# Patient Record
Sex: MALE | Race: WHITE | NOT HISPANIC OR LATINO | Employment: OTHER | URBAN - NONMETROPOLITAN AREA
[De-identification: names, ages, dates, MRNs, and addresses within clinical notes are randomized per-mention and may not be internally consistent; named-entity substitution may affect disease eponyms.]

---

## 2023-01-20 PROBLEM — G47.30 SLEEP APNEA: Status: ACTIVE | Noted: 2023-01-20

## 2023-01-20 PROBLEM — E78.5 HYPERLIPIDEMIA: Status: ACTIVE | Noted: 2023-01-20

## 2023-01-20 PROBLEM — L57.0 ACTINIC KERATOSIS: Status: ACTIVE | Noted: 2023-01-20

## 2023-01-20 PROBLEM — I10 HTN (HYPERTENSION): Status: ACTIVE | Noted: 2023-01-20

## 2023-01-20 PROBLEM — F41.9 ANXIETY: Status: ACTIVE | Noted: 2023-01-20

## 2023-01-20 PROBLEM — N52.9 ERECTILE DYSFUNCTION: Status: ACTIVE | Noted: 2023-01-20

## 2023-01-20 PROBLEM — M25.562 CHRONIC PAIN OF LEFT KNEE: Status: ACTIVE | Noted: 2023-01-20

## 2023-01-20 PROBLEM — G89.29 CHRONIC PAIN OF LEFT KNEE: Status: ACTIVE | Noted: 2023-01-20

## 2023-01-20 PROBLEM — R00.2 PALPITATIONS: Status: ACTIVE | Noted: 2023-01-20

## 2023-01-20 PROBLEM — I49.3 SYMPTOMATIC PVCS: Status: ACTIVE | Noted: 2023-01-20

## 2023-01-20 RX ORDER — LISINOPRIL 10 MG/1
10 TABLET ORAL DAILY
COMMUNITY
End: 2023-03-07 | Stop reason: SDUPTHER

## 2023-01-20 RX ORDER — CARVEDILOL 12.5 MG/1
12.5 TABLET ORAL 2 TIMES DAILY
COMMUNITY
End: 2023-03-07 | Stop reason: SDUPTHER

## 2023-01-20 RX ORDER — CHROMIUM PICOLINATE 200 MCG
TABLET ORAL DAILY
COMMUNITY

## 2023-01-20 RX ORDER — SILDENAFIL 25 MG/1
1-2 TABLET, FILM COATED ORAL AS NEEDED
COMMUNITY
End: 2023-04-27 | Stop reason: SDUPTHER

## 2023-03-03 LAB
ALANINE AMINOTRANSFERASE (SGPT) (U/L) IN SER/PLAS: 21 U/L (ref 10–52)
ALBUMIN (G/DL) IN SER/PLAS: 3.6 G/DL (ref 3.4–5)
ALKALINE PHOSPHATASE (U/L) IN SER/PLAS: 40 U/L (ref 33–136)
ANION GAP IN SER/PLAS: 12 MMOL/L (ref 10–20)
ASPARTATE AMINOTRANSFERASE (SGOT) (U/L) IN SER/PLAS: 20 U/L (ref 9–39)
BILIRUBIN TOTAL (MG/DL) IN SER/PLAS: 0.5 MG/DL (ref 0–1.2)
CALCIUM (MG/DL) IN SER/PLAS: 9.3 MG/DL (ref 8.6–10.3)
CARBON DIOXIDE, TOTAL (MMOL/L) IN SER/PLAS: 26 MMOL/L (ref 21–32)
CHLORIDE (MMOL/L) IN SER/PLAS: 105 MMOL/L (ref 98–107)
CHOLESTEROL (MG/DL) IN SER/PLAS: 249 MG/DL (ref 0–199)
CHOLESTEROL IN HDL (MG/DL) IN SER/PLAS: 67.2 MG/DL
CHOLESTEROL/HDL RATIO: 3.7
CREATININE (MG/DL) IN SER/PLAS: 0.84 MG/DL (ref 0.5–1.3)
GFR MALE: >90 ML/MIN/1.73M2
GLUCOSE (MG/DL) IN SER/PLAS: 77 MG/DL (ref 74–99)
LDL: 164 MG/DL (ref 0–99)
POTASSIUM (MMOL/L) IN SER/PLAS: 4.7 MMOL/L (ref 3.5–5.3)
PROTEIN TOTAL: 6.8 G/DL (ref 6.4–8.2)
SODIUM (MMOL/L) IN SER/PLAS: 138 MMOL/L (ref 136–145)
TRIGLYCERIDE (MG/DL) IN SER/PLAS: 87 MG/DL (ref 0–149)
UREA NITROGEN (MG/DL) IN SER/PLAS: 15 MG/DL (ref 6–23)
VLDL: 17 MG/DL (ref 0–40)

## 2023-03-06 LAB
PROSTATE SPECIFIC AG (NG/ML) IN SER/PLAS: 1.4 NG/ML (ref 0–4)
PROSTATE SPECIFIC AG FREE (NG/ML) IN SER/PLAS: 0.3 NG/ML
PROSTATE SPECIFIC AG FREE/PROSTATE SPECIFIC AG TOTAL IN SER/PLAS: 21 %

## 2023-03-07 ENCOUNTER — OFFICE VISIT (OUTPATIENT)
Dept: PRIMARY CARE | Facility: CLINIC | Age: 63
End: 2023-03-07
Payer: COMMERCIAL

## 2023-03-07 VITALS
SYSTOLIC BLOOD PRESSURE: 120 MMHG | WEIGHT: 218.6 LBS | HEART RATE: 68 BPM | BODY MASS INDEX: 27.32 KG/M2 | OXYGEN SATURATION: 93 % | DIASTOLIC BLOOD PRESSURE: 80 MMHG | TEMPERATURE: 96.8 F

## 2023-03-07 DIAGNOSIS — E78.49 OTHER HYPERLIPIDEMIA: ICD-10-CM

## 2023-03-07 DIAGNOSIS — G89.29 CHRONIC PAIN OF LEFT KNEE: ICD-10-CM

## 2023-03-07 DIAGNOSIS — I10 PRIMARY HYPERTENSION: ICD-10-CM

## 2023-03-07 DIAGNOSIS — Z00.00 ROUTINE GENERAL MEDICAL EXAMINATION AT A HEALTH CARE FACILITY: Primary | ICD-10-CM

## 2023-03-07 DIAGNOSIS — M25.562 CHRONIC PAIN OF LEFT KNEE: ICD-10-CM

## 2023-03-07 PROCEDURE — 3079F DIAST BP 80-89 MM HG: CPT

## 2023-03-07 PROCEDURE — 3074F SYST BP LT 130 MM HG: CPT

## 2023-03-07 PROCEDURE — 1036F TOBACCO NON-USER: CPT

## 2023-03-07 PROCEDURE — 99396 PREV VISIT EST AGE 40-64: CPT

## 2023-03-07 RX ORDER — CARVEDILOL 12.5 MG/1
12.5 TABLET ORAL 2 TIMES DAILY
Qty: 60 TABLET | Refills: 0 | Status: SHIPPED | OUTPATIENT
Start: 2023-03-07 | End: 2023-04-27 | Stop reason: SDUPTHER

## 2023-03-07 RX ORDER — LISINOPRIL 10 MG/1
10 TABLET ORAL DAILY
Qty: 30 TABLET | Refills: 0 | Status: SHIPPED | OUTPATIENT
Start: 2023-03-07 | End: 2023-04-27 | Stop reason: SDUPTHER

## 2023-03-07 RX ORDER — ZINC GLUCONATE 50 MG
50 TABLET ORAL DAILY
COMMUNITY

## 2023-03-07 ASSESSMENT — ENCOUNTER SYMPTOMS
ALLERGIC/IMMUNOLOGIC NEGATIVE: 1
SWEATS: 0
CARDIOVASCULAR NEGATIVE: 1
PSYCHIATRIC NEGATIVE: 1
FEVER: 0
HEADACHES: 0
ENDOCRINE NEGATIVE: 1
SHORTNESS OF BREATH: 0
BLURRED VISION: 0
NECK PAIN: 0
FATIGUE: 0
MUSCULOSKELETAL NEGATIVE: 1
WEAKNESS: 0
PND: 0
ORTHOPNEA: 0
GASTROINTESTINAL NEGATIVE: 1
DIZZINESS: 0
RESPIRATORY NEGATIVE: 1
UNEXPECTED WEIGHT CHANGE: 0
ACTIVITY CHANGE: 0
ABDOMINAL PAIN: 0
HYPERTENSION: 1
PALPITATIONS: 0

## 2023-03-07 ASSESSMENT — LIFESTYLE VARIABLES
HOW OFTEN DO YOU HAVE SIX OR MORE DRINKS ON ONE OCCASION: NEVER
HOW OFTEN DO YOU HAVE SIX OR MORE DRINKS ON ONE OCCASION: NEVER
HOW OFTEN DO YOU HAVE A DRINK CONTAINING ALCOHOL: 2-3 TIMES A WEEK
HOW MANY STANDARD DRINKS CONTAINING ALCOHOL DO YOU HAVE ON A TYPICAL DAY: PATIENT DOES NOT DRINK
AUDIT-C TOTAL SCORE: 3
HOW MANY STANDARD DRINKS CONTAINING ALCOHOL DO YOU HAVE ON A TYPICAL DAY: 1 OR 2
SKIP TO QUESTIONS 9-10: 1
SKIP TO QUESTIONS 9-10: 1
AUDIT-C TOTAL SCORE: 3
HOW OFTEN DO YOU HAVE A DRINK CONTAINING ALCOHOL: 2-3 TIMES A WEEK

## 2023-03-07 NOTE — PROGRESS NOTES
Subjective   Patient ID: Charlie Fisher is a 62 y.o. male who presents for Hypertension (Charlie is here for his blood pressure follow up. This morning it was 125/75. ).  Wellness and Following up BP. He had labs drawn prior to his appointment.     Discussed statin, had cath 2 years back which was normal.  He declines statin therapy at this time  Plan is to maintain healthy diet and exercise  Alcohol in moderation    Hypertension  This is a recurrent problem. The current episode started more than 1 month ago. The problem has been gradually improving since onset. The problem is controlled. Pertinent negatives include no anxiety, blurred vision, chest pain, headaches, malaise/fatigue, neck pain, orthopnea, palpitations, peripheral edema, PND, shortness of breath or sweats. There are no associated agents to hypertension. Risk factors for coronary artery disease include dyslipidemia. There are no compliance problems.        Vitals:    03/07/23 0909   BP: 120/80   Pulse: 68   Temp: 36 °C (96.8 °F)   SpO2: 93%       Review of Systems   Constitutional:  Negative for activity change, fatigue, fever, malaise/fatigue and unexpected weight change.   HENT: Negative.     Eyes:  Negative for blurred vision.   Respiratory: Negative.  Negative for shortness of breath.    Cardiovascular: Negative.  Negative for chest pain, palpitations, orthopnea and PND.   Gastrointestinal: Negative.  Negative for abdominal pain.   Endocrine: Negative.    Musculoskeletal: Negative.  Negative for neck pain.   Skin: Negative.    Allergic/Immunologic: Negative.    Neurological:  Negative for dizziness, weakness and headaches.   Psychiatric/Behavioral: Negative.         Objective   Physical Exam  Constitutional:       Appearance: Normal appearance.   HENT:      Head: Normocephalic.      Mouth/Throat:      Mouth: Mucous membranes are moist.   Cardiovascular:      Rate and Rhythm: Normal rate and regular rhythm.      Pulses: Normal pulses.      Heart  sounds: Normal heart sounds. No murmur heard.     No friction rub. No gallop.   Pulmonary:      Effort: Pulmonary effort is normal. No respiratory distress.      Breath sounds: Normal breath sounds. No wheezing.   Abdominal:      General: Bowel sounds are normal. There is no distension.      Palpations: Abdomen is soft.      Tenderness: There is no abdominal tenderness.   Musculoskeletal:         General: No deformity. Normal range of motion.   Skin:     General: Skin is warm and dry.      Capillary Refill: Capillary refill takes less than 2 seconds.   Neurological:      General: No focal deficit present.      Mental Status: He is alert and oriented to person, place, and time.   Psychiatric:         Mood and Affect: Mood normal.       Component      Latest Ref Rng 3/3/2023   CHOLESTEROL      0 - 199 mg/dL 249 (H)    HDL CHOLESTEROL      mg/dL 67.2    Cholesterol/HDL Ratio 3.7    LDL      0 - 99 mg/dL 164 (H)    VLDL      0 - 40 mg/dL 17    TRIGLYCERIDES      0 - 149 mg/dL 87       Legend:  (H) High  Component      Latest Ref Rng 3/3/2023   GLUCOSE      74 - 99 mg/dL 77    SODIUM      136 - 145 mmol/L 138    POTASSIUM      3.5 - 5.3 mmol/L 4.7    CHLORIDE      98 - 107 mmol/L 105    Bicarbonate      21 - 32 mmol/L 26    Anion Gap      10 - 20 mmol/L 12    Blood Urea Nitrogen      6 - 23 mg/dL 15    Creatinine      0.50 - 1.30 mg/dL 0.84    Calcium      8.6 - 10.3 mg/dL 9.3    GFR MALE      >90 mL/min/1.73m2 >90    Albumin      3.4 - 5.0 g/dL 3.6    Alkaline Phosphatase      33 - 136 U/L 40    Total Protein      6.4 - 8.2 g/dL 6.8    AST      9 - 39 U/L 20    Bilirubin Total      0.0 - 1.2 mg/dL 0.5    ALT      10 - 52 U/L 21        Component      Latest Ref Rng 3/3/2023   PSA      0.0 - 4.0 ng/mL 1.4    PSA, Free      ng/mL 0.3    PSA, Free Pct      % 21          Assessment/Plan   Problem List Items Addressed This Visit          Nervous    Chronic pain of left knee       Circulatory    HTN (hypertension)    Relevant  Medications    lisinopril 10 mg tablet    carvedilol (Coreg) 12.5 mg tablet       Other    Hyperlipidemia     Other Visit Diagnoses       Routine general medical examination at a health care facility    -  Primary    Healthy diet and exercise                  Thank you for coming in today, please call my office if you have any concerns or questions.     Claudio ZAPIEN, CNP

## 2023-03-07 NOTE — PATIENT INSTRUCTIONS
Continue to cut back on Sugars, and Carbohydrates  Healthy exercise, plenty of water during the day.     See in 1 year, or anytime sooner.   Thank you for coming in today, if any questions or concerns arise, please call my office.     CURRY Juarez-CNP

## 2023-04-27 DIAGNOSIS — N52.9 ERECTILE DYSFUNCTION, UNSPECIFIED ERECTILE DYSFUNCTION TYPE: ICD-10-CM

## 2023-04-27 DIAGNOSIS — I10 PRIMARY HYPERTENSION: ICD-10-CM

## 2023-04-27 RX ORDER — CARVEDILOL 12.5 MG/1
12.5 TABLET ORAL 2 TIMES DAILY
Qty: 180 TABLET | Refills: 0 | Status: SHIPPED | OUTPATIENT
Start: 2023-04-27 | End: 2023-07-28 | Stop reason: SDUPTHER

## 2023-04-27 RX ORDER — LISINOPRIL 10 MG/1
10 TABLET ORAL DAILY
Qty: 90 TABLET | Refills: 0 | Status: SHIPPED | OUTPATIENT
Start: 2023-04-27 | End: 2023-07-28 | Stop reason: SDUPTHER

## 2023-04-27 RX ORDER — SILDENAFIL 25 MG/1
25-50 TABLET, FILM COATED ORAL AS NEEDED
Qty: 30 TABLET | Refills: 1 | Status: SHIPPED | OUTPATIENT
Start: 2023-04-27 | End: 2024-04-19 | Stop reason: SDUPTHER

## 2023-07-24 ENCOUNTER — OFFICE VISIT (OUTPATIENT)
Dept: PRIMARY CARE | Facility: CLINIC | Age: 63
End: 2023-07-24
Payer: COMMERCIAL

## 2023-07-24 VITALS — OXYGEN SATURATION: 94 % | HEART RATE: 101 BPM | TEMPERATURE: 97.8 F

## 2023-07-24 DIAGNOSIS — J18.9 COMMUNITY ACQUIRED PNEUMONIA OF RIGHT LOWER LOBE OF LUNG: Primary | ICD-10-CM

## 2023-07-24 PROCEDURE — 1036F TOBACCO NON-USER: CPT

## 2023-07-24 PROCEDURE — 87635 SARS-COV-2 COVID-19 AMP PRB: CPT

## 2023-07-24 PROCEDURE — 99214 OFFICE O/P EST MOD 30 MIN: CPT

## 2023-07-24 RX ORDER — FLUTICASONE PROPIONATE 50 MCG
1 SPRAY, SUSPENSION (ML) NASAL DAILY
Qty: 16 G | Refills: 11 | Status: SHIPPED | OUTPATIENT
Start: 2023-07-24 | End: 2024-07-23

## 2023-07-24 RX ORDER — AMOXICILLIN AND CLAVULANATE POTASSIUM 875; 125 MG/1; MG/1
875 TABLET, FILM COATED ORAL 2 TIMES DAILY
Qty: 20 TABLET | Refills: 0 | Status: SHIPPED | OUTPATIENT
Start: 2023-07-24 | End: 2023-08-03

## 2023-07-24 RX ORDER — ALBUTEROL SULFATE 90 UG/1
2 AEROSOL, METERED RESPIRATORY (INHALATION) EVERY 4 HOURS PRN
Qty: 8 G | Refills: 5 | Status: SHIPPED | OUTPATIENT
Start: 2023-07-24 | End: 2024-07-23

## 2023-07-24 RX ORDER — BENZONATATE 200 MG/1
200 CAPSULE ORAL 3 TIMES DAILY PRN
Qty: 42 CAPSULE | Refills: 0 | Status: SHIPPED | OUTPATIENT
Start: 2023-07-24 | End: 2023-08-07

## 2023-07-24 ASSESSMENT — ENCOUNTER SYMPTOMS: SORE THROAT: 1

## 2023-07-24 NOTE — PROGRESS NOTES
Subjective   Patient ID: Charlie Fisher is a 62 y.o. male who presents for Sore Throat (Charlie is here for a cough and sore throat. Has lost taste and smell. Has not been in contact with anyone who is sick. ).  Subjective  Charlie Fisher is a 62 y.o. male who presents for evaluation of cough, possible pneumonia. Symptoms include: chills without rigors, fatigue, fever to 101, malaise, and myalgias. Symptoms began 5 days ago, and have been gradually worsening since that time. Patient denies dyspnea, chest pain, or nausea and vomiting. Treatment thus far includes: OTC analgesics/antipyretics: somewhat effective and anti-tussive: ineffective. Past pulmonary history is significant for frequent episodes of bronchitis.    Objective  Oxygen saturation 94% on room air  [unfilled]     Assessment/Plan  Community acquired pneumonia.    The diagnosis was discussed along with the usual course and treatment.  Educational material distributed.  Arrangements made for hospital admission.  Started antibiotics per medication orders.      Sore Throat         Vitals:    07/24/23 1534   Pulse: 101   Temp: 36.6 °C (97.8 °F)   SpO2: 94%       Review of Systems   HENT:  Positive for sore throat.        Objective   Physical Exam    Assessment/Plan   Problem List Items Addressed This Visit    None  Visit Diagnoses       Community acquired pneumonia of right lower lobe of lung    -  Primary    Relevant Medications    amoxicillin-pot clavulanate (Augmentin) 875-125 mg tablet    albuterol (Ventolin HFA) 90 mcg/actuation inhaler    benzonatate (Tessalon) 200 mg capsule    fluticasone (Flonase) 50 mcg/actuation nasal spray    Other Relevant Orders    XR chest 2 views                 Thank you for coming in today, please call my office if you have any concerns or questions.     Claudio ZAPIEN, CNP

## 2023-07-25 DIAGNOSIS — U07.1 COVID-19: Primary | ICD-10-CM

## 2023-07-25 LAB — SARS-COV-2 RESULT: DETECTED

## 2023-07-25 NOTE — RESULT ENCOUNTER NOTE
Positive COVID, notified, starting PAXLOVID, his wife is ill as well, she is scheduling to have testing done today for COVID, if positive, we will start her on PAXLOVID as well.

## 2023-07-27 ENCOUNTER — TELEPHONE (OUTPATIENT)
Dept: PRIMARY CARE | Facility: CLINIC | Age: 63
End: 2023-07-27
Payer: COMMERCIAL

## 2023-07-27 NOTE — TELEPHONE ENCOUNTER
Spoke with Charlie at 12:05PM. He is still having some blood when he is blowing his nose every once in awhile. Sore throat is gone.

## 2023-07-27 NOTE — TELEPHONE ENCOUNTER
----- Message from CURRY Juarez-CNP sent at 7/26/2023  7:51 AM EDT -----  CXR negative. Continue Conservative measures.

## 2023-07-28 DIAGNOSIS — I10 PRIMARY HYPERTENSION: ICD-10-CM

## 2023-07-28 RX ORDER — LISINOPRIL 10 MG/1
10 TABLET ORAL DAILY
Qty: 90 TABLET | Refills: 0 | Status: SHIPPED | OUTPATIENT
Start: 2023-07-28 | End: 2023-10-30 | Stop reason: SDUPTHER

## 2023-07-28 RX ORDER — CARVEDILOL 12.5 MG/1
12.5 TABLET ORAL 2 TIMES DAILY
Qty: 180 TABLET | Refills: 0 | Status: SHIPPED | OUTPATIENT
Start: 2023-07-28 | End: 2023-10-30 | Stop reason: SDUPTHER

## 2023-10-30 DIAGNOSIS — I10 PRIMARY HYPERTENSION: ICD-10-CM

## 2023-10-30 RX ORDER — LISINOPRIL 10 MG/1
10 TABLET ORAL DAILY
Qty: 90 TABLET | Refills: 0 | Status: SHIPPED | OUTPATIENT
Start: 2023-10-30 | End: 2024-01-26 | Stop reason: SDUPTHER

## 2023-10-30 RX ORDER — CARVEDILOL 12.5 MG/1
12.5 TABLET ORAL 2 TIMES DAILY
Qty: 180 TABLET | Refills: 0 | Status: SHIPPED | OUTPATIENT
Start: 2023-10-30 | End: 2024-01-26 | Stop reason: SDUPTHER

## 2024-01-26 DIAGNOSIS — I10 PRIMARY HYPERTENSION: ICD-10-CM

## 2024-01-26 RX ORDER — CARVEDILOL 12.5 MG/1
12.5 TABLET ORAL 2 TIMES DAILY
Qty: 180 TABLET | Refills: 0 | Status: SHIPPED | OUTPATIENT
Start: 2024-01-26 | End: 2024-04-19 | Stop reason: SDUPTHER

## 2024-01-26 RX ORDER — LISINOPRIL 10 MG/1
10 TABLET ORAL DAILY
Qty: 90 TABLET | Refills: 0 | Status: SHIPPED | OUTPATIENT
Start: 2024-01-26 | End: 2024-04-19 | Stop reason: SDUPTHER

## 2024-04-08 ENCOUNTER — TELEPHONE (OUTPATIENT)
Dept: PRIMARY CARE | Facility: CLINIC | Age: 64
End: 2024-04-08
Payer: COMMERCIAL

## 2024-04-08 DIAGNOSIS — N52.9 ERECTILE DYSFUNCTION, UNSPECIFIED ERECTILE DYSFUNCTION TYPE: ICD-10-CM

## 2024-04-08 DIAGNOSIS — I10 PRIMARY HYPERTENSION: ICD-10-CM

## 2024-04-08 DIAGNOSIS — Z00.00 HEALTHCARE MAINTENANCE: Primary | ICD-10-CM

## 2024-04-15 ENCOUNTER — LAB (OUTPATIENT)
Dept: LAB | Facility: LAB | Age: 64
End: 2024-04-15
Payer: COMMERCIAL

## 2024-04-15 DIAGNOSIS — Z00.00 HEALTHCARE MAINTENANCE: ICD-10-CM

## 2024-04-15 LAB
ALBUMIN SERPL BCP-MCNC: 3.5 G/DL (ref 3.4–5)
ALP SERPL-CCNC: 47 U/L (ref 33–136)
ALT SERPL W P-5'-P-CCNC: 21 U/L (ref 10–52)
ANION GAP SERPL CALC-SCNC: 12 MMOL/L (ref 10–20)
AST SERPL W P-5'-P-CCNC: 15 U/L (ref 9–39)
BASOPHILS # BLD AUTO: 0.07 X10*3/UL (ref 0–0.1)
BASOPHILS NFR BLD AUTO: 0.9 %
BILIRUB SERPL-MCNC: 0.5 MG/DL (ref 0–1.2)
BUN SERPL-MCNC: 16 MG/DL (ref 6–23)
CALCIUM SERPL-MCNC: 9.2 MG/DL (ref 8.6–10.3)
CHLORIDE SERPL-SCNC: 104 MMOL/L (ref 98–107)
CHOLEST SERPL-MCNC: 265 MG/DL (ref 0–199)
CHOLESTEROL/HDL RATIO: 4.6
CO2 SERPL-SCNC: 29 MMOL/L (ref 21–32)
CREAT SERPL-MCNC: 0.92 MG/DL (ref 0.5–1.3)
EGFRCR SERPLBLD CKD-EPI 2021: >90 ML/MIN/1.73M*2
EOSINOPHIL # BLD AUTO: 1.16 X10*3/UL (ref 0–0.7)
EOSINOPHIL NFR BLD AUTO: 15.4 %
ERYTHROCYTE [DISTWIDTH] IN BLOOD BY AUTOMATED COUNT: 17.5 % (ref 11.5–14.5)
GLUCOSE SERPL-MCNC: 104 MG/DL (ref 74–99)
HCT VFR BLD AUTO: 53.1 % (ref 41–52)
HDLC SERPL-MCNC: 57.2 MG/DL
HGB BLD-MCNC: 16.4 G/DL (ref 13.5–17.5)
IMM GRANULOCYTES # BLD AUTO: 0.02 X10*3/UL (ref 0–0.7)
IMM GRANULOCYTES NFR BLD AUTO: 0.3 % (ref 0–0.9)
LDLC SERPL CALC-MCNC: 185 MG/DL
LYMPHOCYTES # BLD AUTO: 1.58 X10*3/UL (ref 1.2–4.8)
LYMPHOCYTES NFR BLD AUTO: 21 %
MCH RBC QN AUTO: 24.3 PG (ref 26–34)
MCHC RBC AUTO-ENTMCNC: 30.9 G/DL (ref 32–36)
MCV RBC AUTO: 79 FL (ref 80–100)
MONOCYTES # BLD AUTO: 0.7 X10*3/UL (ref 0.1–1)
MONOCYTES NFR BLD AUTO: 9.3 %
NEUTROPHILS # BLD AUTO: 3.98 X10*3/UL (ref 1.2–7.7)
NEUTROPHILS NFR BLD AUTO: 53.1 %
NON HDL CHOLESTEROL: 208 MG/DL (ref 0–149)
NRBC BLD-RTO: 0 /100 WBCS (ref 0–0)
PLATELET # BLD AUTO: 252 X10*3/UL (ref 150–450)
POTASSIUM SERPL-SCNC: 5.4 MMOL/L (ref 3.5–5.3)
PROT SERPL-MCNC: 6.9 G/DL (ref 6.4–8.2)
RBC # BLD AUTO: 6.75 X10*6/UL (ref 4.5–5.9)
SODIUM SERPL-SCNC: 140 MMOL/L (ref 136–145)
TRIGL SERPL-MCNC: 112 MG/DL (ref 0–149)
TSH SERPL-ACNC: 0.96 MIU/L (ref 0.44–3.98)
VLDL: 22 MG/DL (ref 0–40)
WBC # BLD AUTO: 7.5 X10*3/UL (ref 4.4–11.3)

## 2024-04-15 PROCEDURE — 80053 COMPREHEN METABOLIC PANEL: CPT

## 2024-04-15 PROCEDURE — 36415 COLL VENOUS BLD VENIPUNCTURE: CPT

## 2024-04-15 PROCEDURE — 82306 VITAMIN D 25 HYDROXY: CPT

## 2024-04-15 PROCEDURE — 85025 COMPLETE CBC W/AUTO DIFF WBC: CPT

## 2024-04-15 PROCEDURE — 80061 LIPID PANEL: CPT

## 2024-04-15 PROCEDURE — 84443 ASSAY THYROID STIM HORMONE: CPT

## 2024-04-15 PROCEDURE — 86803 HEPATITIS C AB TEST: CPT

## 2024-04-15 PROCEDURE — 87389 HIV-1 AG W/HIV-1&-2 AB AG IA: CPT

## 2024-04-15 PROCEDURE — 84154 ASSAY OF PSA FREE: CPT

## 2024-04-15 PROCEDURE — 84153 ASSAY OF PSA TOTAL: CPT

## 2024-04-16 LAB
25(OH)D3 SERPL-MCNC: 61 NG/ML (ref 30–100)
HCV AB SER QL: NONREACTIVE
HIV 1+2 AB+HIV1 P24 AG SERPL QL IA: NONREACTIVE

## 2024-04-17 LAB
PSA FREE MFR SERPL: 21 %
PSA FREE SERPL-MCNC: 0.3 NG/ML
PSA SERPL IA-MCNC: 1.4 NG/ML (ref 0–4)

## 2024-04-17 ASSESSMENT — PROMIS GLOBAL HEALTH SCALE
EMOTIONAL_PROBLEMS: NEVER
CARRYOUT_PHYSICAL_ACTIVITIES: COMPLETELY
RATE_PHYSICAL_HEALTH: VERY GOOD
RATE_AVERAGE_FATIGUE: MILD
RATE_QUALITY_OF_LIFE: VERY GOOD
RATE_AVERAGE_PAIN: 2
RATE_MENTAL_HEALTH: VERY GOOD
RATE_SOCIAL_SATISFACTION: VERY GOOD
CARRYOUT_SOCIAL_ACTIVITIES: EXCELLENT
RATE_GENERAL_HEALTH: VERY GOOD

## 2024-04-19 ENCOUNTER — OFFICE VISIT (OUTPATIENT)
Dept: PRIMARY CARE | Facility: CLINIC | Age: 64
End: 2024-04-19
Payer: COMMERCIAL

## 2024-04-19 VITALS
HEART RATE: 96 BPM | SYSTOLIC BLOOD PRESSURE: 120 MMHG | OXYGEN SATURATION: 97 % | WEIGHT: 218 LBS | DIASTOLIC BLOOD PRESSURE: 80 MMHG | TEMPERATURE: 97.4 F | HEIGHT: 75 IN | BODY MASS INDEX: 27.1 KG/M2

## 2024-04-19 DIAGNOSIS — Z00.00 ROUTINE GENERAL MEDICAL EXAMINATION AT A HEALTH CARE FACILITY: Primary | ICD-10-CM

## 2024-04-19 DIAGNOSIS — J45.20 MILD INTERMITTENT REACTIVE AIRWAY DISEASE WITHOUT COMPLICATION (HHS-HCC): ICD-10-CM

## 2024-04-19 DIAGNOSIS — L57.0 ACTINIC KERATOSIS: ICD-10-CM

## 2024-04-19 PROCEDURE — 99396 PREV VISIT EST AGE 40-64: CPT

## 2024-04-19 PROCEDURE — 3074F SYST BP LT 130 MM HG: CPT

## 2024-04-19 PROCEDURE — 3079F DIAST BP 80-89 MM HG: CPT

## 2024-04-19 PROCEDURE — 1036F TOBACCO NON-USER: CPT

## 2024-04-19 PROCEDURE — 99213 OFFICE O/P EST LOW 20 MIN: CPT

## 2024-04-19 RX ORDER — SILDENAFIL 25 MG/1
25-50 TABLET, FILM COATED ORAL AS NEEDED
Qty: 30 TABLET | Refills: 1 | Status: SHIPPED | OUTPATIENT
Start: 2024-04-19

## 2024-04-19 RX ORDER — CARVEDILOL 12.5 MG/1
12.5 TABLET ORAL 2 TIMES DAILY
Qty: 180 TABLET | Refills: 0 | Status: SHIPPED | OUTPATIENT
Start: 2024-04-19 | End: 2024-07-18

## 2024-04-19 RX ORDER — LISINOPRIL 10 MG/1
10 TABLET ORAL DAILY
Qty: 90 TABLET | Refills: 0 | Status: SHIPPED | OUTPATIENT
Start: 2024-04-19 | End: 2024-07-18

## 2024-04-19 NOTE — TELEPHONE ENCOUNTER
Charlie forgot to ask about refills Carvedilol 12.5 mg, Lisinopril 10 mg, and sidenafil 25 mg  to [facility] in [place]

## 2024-04-19 NOTE — PROGRESS NOTES
Subjective   Patient ID: Charlie Fisher is a 63 y.o. male who presents for Annual Exam (Charlie is here for his annual exam. Still has a cough with phlegm once a night then it goes away. Has blood work that was abnormal, that indicates for asthma. ).  Adult cath, good for ten years per cardiology.    Reactive airway disease.  --rare, occasional use of albuterol inhaler.    Subjective  Charlie Fisher is a 63 y.o. male and is here for a comprehensive physical exam. The patient reports no problems.    Do you take any herbs or supplements that were not prescribed by a doctor? no  Are you taking calcium supplements? no  Are you taking aspirin daily? no     Do you have pain that bothers you in your daily life? no  [unfilled]     Objective  [unfilled]    Assessment/Plan  Healthy male exam.      1. COLO: due 2027  2. Patient Counseling:  --Nutrition: Stressed importance of moderation in sodium/caffeine intake, saturated fat and cholesterol, caloric balance, sufficient intake of fresh fruits, vegetables, fiber, calcium, iron, and 1 mg of folate supplement per day (for females capable of pregnancy).  --Discussed the issue of estrogen replacement, calcium supplement, and the daily use of baby aspirin.  --Exercise: Stressed the importance of regular exercise.   --Substance Abuse: Discussed cessation/primary prevention of tobacco, alcohol, or other drug use; driving or other dangerous activities under the influence; availability of treatment for abuse.    --Sexuality: Discussed sexually transmitted diseases, partner selection, use of condoms, avoidance of unintended pregnancy  and contraceptive alternatives.   --Injury prevention: Discussed safety belts, safety helmets, smoke detector, smoking near bedding or upholstery.   --Dental health: Discussed importance of regular tooth brushing, flossing, and dental visits.  --Immunizations reviewed.  --Discussed benefits of screening colonoscopy.  --After hours service discussed with  patient  3. Discussed the patient's BMI with him.  The BMI is above average. The patient received Provided instructions on dietary changes  Provided instructions on exercise  Advised to Increase physical activity because they have an above normal BMI.  4. Follow up as needed for acute illness and yearly          Vitals:    04/19/24 0939   BP: 120/80   Pulse: 96   Temp: 36.3 °C (97.4 °F)   SpO2: 97%       Review of Systems    Objective   Physical Exam  Vitals and nursing note reviewed.   Constitutional:       Appearance: Normal appearance.   HENT:      Head: Normocephalic.      Mouth/Throat:      Mouth: Mucous membranes are moist.   Cardiovascular:      Rate and Rhythm: Normal rate and regular rhythm.      Pulses: Normal pulses.      Heart sounds: Normal heart sounds. No murmur heard.     No friction rub. No gallop.   Pulmonary:      Effort: Pulmonary effort is normal. No respiratory distress.      Breath sounds: No wheezing.      Comments: Diminished bilateral bases.  Abdominal:      General: Bowel sounds are normal. There is no distension.      Palpations: Abdomen is soft.      Tenderness: There is no abdominal tenderness.   Musculoskeletal:         General: No deformity. Normal range of motion.   Skin:     General: Skin is warm and dry.      Capillary Refill: Capillary refill takes less than 2 seconds.   Neurological:      General: No focal deficit present.      Mental Status: He is alert and oriented to person, place, and time.   Psychiatric:         Mood and Affect: Mood normal.         Assessment/Plan   Problem List Items Addressed This Visit       Actinic keratosis    Relevant Orders    Referral to Dermatology     Other Visit Diagnoses       Routine general medical examination at a health care facility    -  Primary    Mild intermittent reactive airway disease without complication ([facility])        Relevant Orders    Complete Pulmonary Function Test Pre/Post Bronchodialator (Spirometry Pre/Post/DLCO/Lung  Volumes)                 Thank you for coming in today, please call my office if you have any concerns or questions.     Claudio ZAPIEN, CNP

## 2024-04-19 NOTE — PATIENT INSTRUCTIONS
Complete PFT for reactive airway disease.  Colonoscopy in 2027    Thank you for coming in today, if any questions or concerns arise, please call my office.   CURRY Juarez-CNP

## 2024-05-01 ENCOUNTER — HOSPITAL ENCOUNTER (OUTPATIENT)
Dept: RESPIRATORY THERAPY | Facility: HOSPITAL | Age: 64
End: 2024-05-01
Payer: COMMERCIAL

## 2024-06-14 ENCOUNTER — OFFICE VISIT (OUTPATIENT)
Dept: PRIMARY CARE | Facility: CLINIC | Age: 64
End: 2024-06-14
Payer: COMMERCIAL

## 2024-06-14 VITALS
DIASTOLIC BLOOD PRESSURE: 90 MMHG | BODY MASS INDEX: 27.87 KG/M2 | WEIGHT: 220 LBS | HEART RATE: 69 BPM | TEMPERATURE: 97.9 F | OXYGEN SATURATION: 96 % | SYSTOLIC BLOOD PRESSURE: 130 MMHG

## 2024-06-14 DIAGNOSIS — M75.41 IMPINGEMENT SYNDROME OF RIGHT SHOULDER: Primary | ICD-10-CM

## 2024-06-14 PROCEDURE — 99214 OFFICE O/P EST MOD 30 MIN: CPT

## 2024-06-14 PROCEDURE — 3075F SYST BP GE 130 - 139MM HG: CPT

## 2024-06-14 PROCEDURE — 3080F DIAST BP >= 90 MM HG: CPT

## 2024-06-14 RX ORDER — KETOROLAC TROMETHAMINE 10 MG/1
10 TABLET, FILM COATED ORAL 4 TIMES DAILY PRN
Qty: 20 TABLET | Refills: 0 | Status: SHIPPED | OUTPATIENT
Start: 2024-06-14 | End: 2024-06-19

## 2024-06-14 RX ORDER — CYCLOBENZAPRINE HCL 5 MG
5 TABLET ORAL 3 TIMES DAILY PRN
Qty: 30 TABLET | Refills: 0 | Status: SHIPPED | OUTPATIENT
Start: 2024-06-14 | End: 2024-06-24

## 2024-06-14 RX ORDER — METHYLPREDNISOLONE 4 MG/1
TABLET ORAL
Qty: 21 TABLET | Refills: 0 | Status: SHIPPED | OUTPATIENT
Start: 2024-06-14 | End: 2024-06-21

## 2024-06-14 NOTE — PROGRESS NOTES
Subjective   Patient ID: Charlie Fisher is a 63 y.o. male who presents for Arm Pain (Charlie is here for right shoulder pain. Has been hurting since Tuesday night.  No popping or pulling sensation. Started as an ache then got worse. ).  Subjective  Charlie Fisher is a 63 y.o. male who presents with Right shoulder pain. The symptoms began several days ago. Aggravating factors: an injury while lifting an object. Pain is located around the acromioclavicular (AC) joint. Discomfort is described as aching, sharp/stabbing, and throbbing. Symptoms are exacerbated by repetitive movements, overhead movements, and lying on the shoulder. Evaluation to date: none. Therapy to date includes: rest, ice, and avoidance of offending activity.    Objective  /90   Pulse 69   Temp 36.6 °C (97.9 °F)   Wt 99.8 kg (220 lb)   SpO2 96%   BMI 27.87 kg/m²   Right shoulder: tenderness over the glenohumeral joint and positive for impingement sign  Left shoulder: normal active ROM, no tenderness, no impingement sign    Assessment/Plan  Right shoulder impingement    Natural history and expected course discussed. Questions answered.  Educational material distributed.  Gentle ROM exercises.  Rest, ice, compression, and elevation (RICE) therapy.  NSAIDs per medication orders.  See prescribed meds.          Vitals:    06/14/24 1121   BP: 130/90   Pulse: 69   Temp: 36.6 °C (97.9 °F)   SpO2: 96%       Review of Systems    Objective   Physical Exam    Assessment/Plan   Problem List Items Addressed This Visit    None  Visit Diagnoses       Impingement syndrome of right shoulder    -  Primary    Relevant Medications    methylPREDNISolone (Medrol Dospak) 4 mg tablets    cyclobenzaprine (Flexeril) 5 mg tablet    ketorolac (Toradol) 10 mg tablet    Other Relevant Orders    XR shoulder right 2+ views                 Thank you for coming in today, please call my office if you have any concerns or questions.     Claudio ZAPIEN, CNP

## 2024-06-14 NOTE — PATIENT INSTRUCTIONS
Start Medrol dosepak for the acute pain   Flexeril is a muscle relaxant  I also ordered XR of the shoulder to evaluate.    Thank you for coming in today, if any questions or concerns arise, please call my office.   CURRY Juarez-CNP

## 2024-06-17 ENCOUNTER — HOSPITAL ENCOUNTER (OUTPATIENT)
Dept: RADIOLOGY | Facility: CLINIC | Age: 64
Discharge: HOME | End: 2024-06-17
Payer: COMMERCIAL

## 2024-06-17 DIAGNOSIS — M75.41 IMPINGEMENT SYNDROME OF RIGHT SHOULDER: ICD-10-CM

## 2024-06-17 PROCEDURE — 73030 X-RAY EXAM OF SHOULDER: CPT | Mod: RIGHT SIDE | Performed by: RADIOLOGY

## 2024-06-17 PROCEDURE — 73030 X-RAY EXAM OF SHOULDER: CPT | Mod: RT

## 2024-06-20 DIAGNOSIS — M75.41 IMPINGEMENT SYNDROME OF RIGHT SHOULDER: ICD-10-CM

## 2024-06-20 DIAGNOSIS — Z00.00 ROUTINE GENERAL MEDICAL EXAMINATION AT A HEALTH CARE FACILITY: Primary | ICD-10-CM

## 2024-06-20 RX ORDER — TRAMADOL HYDROCHLORIDE 50 MG/1
50 TABLET ORAL EVERY 6 HOURS PRN
Qty: 28 TABLET | Refills: 0 | Status: SHIPPED | OUTPATIENT
Start: 2024-06-20 | End: 2024-06-27

## 2024-06-20 RX ORDER — PREDNISONE 20 MG/1
40 TABLET ORAL DAILY
Qty: 10 TABLET | Refills: 0 | Status: SHIPPED | OUTPATIENT
Start: 2024-06-20 | End: 2024-06-25

## 2024-06-20 NOTE — PROGRESS NOTES
Patient with continued shoulder pain, continues to have trouble lifting, notified of XR results and discussed with patient, short dose Tramadol sent and higher dose steroid to target tendinitis.

## 2024-06-20 NOTE — RESULT ENCOUNTER NOTE
Results were abnormal... I talked with patient myself today via phone, please DONE this task at this time. Thanks.

## 2024-07-29 DIAGNOSIS — I10 PRIMARY HYPERTENSION: ICD-10-CM

## 2024-07-29 RX ORDER — LISINOPRIL 10 MG/1
10 TABLET ORAL DAILY
Qty: 90 TABLET | Refills: 0 | Status: SHIPPED | OUTPATIENT
Start: 2024-07-29 | End: 2024-10-27

## 2024-07-29 RX ORDER — CARVEDILOL 12.5 MG/1
12.5 TABLET ORAL 2 TIMES DAILY
Qty: 180 TABLET | Refills: 0 | Status: SHIPPED | OUTPATIENT
Start: 2024-07-29 | End: 2024-10-27

## 2024-10-29 DIAGNOSIS — I10 PRIMARY HYPERTENSION: ICD-10-CM

## 2024-10-29 RX ORDER — LISINOPRIL 10 MG/1
10 TABLET ORAL DAILY
Qty: 90 TABLET | Refills: 0 | Status: SHIPPED | OUTPATIENT
Start: 2024-10-29 | End: 2025-01-27

## 2024-10-29 RX ORDER — CARVEDILOL 12.5 MG/1
12.5 TABLET ORAL 2 TIMES DAILY
Qty: 180 TABLET | Refills: 0 | Status: SHIPPED | OUTPATIENT
Start: 2024-10-29 | End: 2025-01-27

## 2024-11-26 ENCOUNTER — APPOINTMENT (OUTPATIENT)
Dept: OTOLARYNGOLOGY | Facility: CLINIC | Age: 64
End: 2024-11-26
Payer: COMMERCIAL

## 2025-01-24 DIAGNOSIS — I10 PRIMARY HYPERTENSION: ICD-10-CM

## 2025-01-24 RX ORDER — LISINOPRIL 10 MG/1
10 TABLET ORAL DAILY
Qty: 90 TABLET | Refills: 0 | Status: SHIPPED | OUTPATIENT
Start: 2025-01-24 | End: 2025-04-24

## 2025-01-24 RX ORDER — CARVEDILOL 12.5 MG/1
12.5 TABLET ORAL 2 TIMES DAILY
Qty: 180 TABLET | Refills: 0 | Status: SHIPPED | OUTPATIENT
Start: 2025-01-24 | End: 2025-04-24

## 2025-04-14 ASSESSMENT — ENCOUNTER SYMPTOMS
BACK PAIN: 1
WEAKNESS: 1
LEG PAIN: 1

## 2025-04-14 ASSESSMENT — PROMIS GLOBAL HEALTH SCALE
CARRYOUT_PHYSICAL_ACTIVITIES: MOSTLY
RATE_AVERAGE_FATIGUE: MILD
RATE_AVERAGE_PAIN: 5
RATE_GENERAL_HEALTH: VERY GOOD
RATE_MENTAL_HEALTH: VERY GOOD
RATE_SOCIAL_SATISFACTION: VERY GOOD
CARRYOUT_SOCIAL_ACTIVITIES: VERY GOOD
RATE_PHYSICAL_HEALTH: VERY GOOD
EMOTIONAL_PROBLEMS: NEVER
RATE_QUALITY_OF_LIFE: VERY GOOD

## 2025-04-15 ENCOUNTER — OFFICE VISIT (OUTPATIENT)
Dept: PRIMARY CARE | Facility: CLINIC | Age: 65
End: 2025-04-15
Payer: COMMERCIAL

## 2025-04-15 VITALS
BODY MASS INDEX: 28.62 KG/M2 | WEIGHT: 225.9 LBS | DIASTOLIC BLOOD PRESSURE: 80 MMHG | HEART RATE: 74 BPM | SYSTOLIC BLOOD PRESSURE: 120 MMHG | TEMPERATURE: 96.9 F | OXYGEN SATURATION: 97 %

## 2025-04-15 DIAGNOSIS — I10 PRIMARY HYPERTENSION: ICD-10-CM

## 2025-04-15 DIAGNOSIS — Z12.5 PROSTATE CANCER SCREENING: ICD-10-CM

## 2025-04-15 DIAGNOSIS — Z00.00 HEALTHCARE MAINTENANCE: ICD-10-CM

## 2025-04-15 DIAGNOSIS — Z13.29 THYROID DISORDER SCREENING: ICD-10-CM

## 2025-04-15 DIAGNOSIS — Z13.0 SCREENING FOR DEFICIENCY ANEMIA: ICD-10-CM

## 2025-04-15 DIAGNOSIS — Z13.89 SCREENING FOR MULTIPLE CONDITIONS: ICD-10-CM

## 2025-04-15 DIAGNOSIS — Z13.1 DIABETES MELLITUS SCREENING: ICD-10-CM

## 2025-04-15 DIAGNOSIS — Z13.220 SCREENING FOR HYPERLIPIDEMIA: ICD-10-CM

## 2025-04-15 DIAGNOSIS — E55.9 VITAMIN D DEFICIENCY: Primary | ICD-10-CM

## 2025-04-15 DIAGNOSIS — M54.50 ACUTE RIGHT-SIDED LOW BACK PAIN WITHOUT SCIATICA: ICD-10-CM

## 2025-04-15 PROCEDURE — 3079F DIAST BP 80-89 MM HG: CPT

## 2025-04-15 PROCEDURE — 99214 OFFICE O/P EST MOD 30 MIN: CPT

## 2025-04-15 PROCEDURE — 1036F TOBACCO NON-USER: CPT

## 2025-04-15 PROCEDURE — 3074F SYST BP LT 130 MM HG: CPT

## 2025-04-15 RX ORDER — CARVEDILOL 12.5 MG/1
12.5 TABLET ORAL 2 TIMES DAILY
Qty: 180 TABLET | Refills: 0 | Status: SHIPPED | OUTPATIENT
Start: 2025-04-15 | End: 2025-07-14

## 2025-04-15 RX ORDER — LISINOPRIL 10 MG/1
10 TABLET ORAL DAILY
Qty: 90 TABLET | Refills: 0 | Status: SHIPPED | OUTPATIENT
Start: 2025-04-15 | End: 2025-07-14

## 2025-04-15 RX ORDER — METHYLPREDNISOLONE 4 MG/1
TABLET ORAL
Qty: 21 TABLET | Refills: 0 | Status: SHIPPED | OUTPATIENT
Start: 2025-04-15 | End: 2025-04-21

## 2025-04-15 RX ORDER — METHOCARBAMOL 500 MG/1
500 TABLET, FILM COATED ORAL 4 TIMES DAILY PRN
Qty: 40 TABLET | Refills: 0 | Status: SHIPPED | OUTPATIENT
Start: 2025-04-15 | End: 2025-06-14

## 2025-04-15 ASSESSMENT — ENCOUNTER SYMPTOMS
BACK PAIN: 1
WEAKNESS: 1
LEG PAIN: 1

## 2025-04-15 NOTE — PATIENT INSTRUCTIONS
Medrol dose [place]ton  Robaxin as needed this is a muscle relaxant    Refilled your BP meds  Labs ordered for this week    XR lumbar spine    Thank you for coming in today, if any questions or concerns arise, please call my office.   CURRY Juarez-CNP

## 2025-04-15 NOTE — PROGRESS NOTES
Subjective   Patient ID: Charlie Fisher is a 64 y.o. male who presents for Back Pain (Was working on his house and felt a shock in his lower back. Reports this happened end of February and he has been seeing his chiropractor for this but has had no relief. Reports sitting makes it worse. Denies any numbness or tingling down legs. Denies any loss of control with bowels.).  Back Pain  This is a recurrent problem. The current episode started more than 1 month ago. The problem occurs constantly. The problem has been waxing and waning since onset. The pain is present in the lumbar spine. The quality of the pain is described as stabbing. The pain does not radiate. The pain is at a severity of 7/10. The pain is Worse during the day. The symptoms are aggravated by bending and sitting. Stiffness is present All day. Associated symptoms include leg pain and weakness.       Vitals:    04/15/25 0945   BP: 120/80   Pulse: 74   Temp: 36.1 °C (96.9 °F)   SpO2: 97%       Review of Systems   Musculoskeletal:  Positive for back pain.   Neurological:  Positive for weakness.       Objective   Physical Exam  Vitals and nursing note reviewed.   Musculoskeletal:      Cervical back: Normal.      Thoracic back: Bony tenderness present.      Lumbar back: Deformity and bony tenderness present. Decreased range of motion. Positive right straight leg raise test.      Right hip: Bony tenderness present. Decreased range of motion.         Assessment/Plan   Problem List Items Addressed This Visit       HTN (hypertension)    Relevant Medications    carvedilol (Coreg) 12.5 mg tablet    lisinopril 10 mg tablet     Other Visit Diagnoses       Vitamin D deficiency    -  Primary    Relevant Orders    Vitamin D 25-Hydroxy,Total (for eval of Vitamin D levels)    Screening for multiple conditions        Relevant Orders    CBC and Auto Differential    Healthcare maintenance        Relevant Orders    Comprehensive Metabolic Panel    Screening for hyperlipidemia         Relevant Orders    Lipid Panel    Thyroid disorder screening        Relevant Orders    TSH with reflex to Free T4 if abnormal    Screening for deficiency anemia        Relevant Orders    Vitamin B12    Diabetes mellitus screening        Relevant Orders    Hemoglobin A1C    Prostate cancer screening        Relevant Orders    PSA, Total and Free    Acute right-sided low back pain without sciatica        Relevant Medications    methylPREDNISolone (Medrol Dospak) 4 mg tablets    methocarbamol (Robaxin) 500 mg tablet    Other Relevant Orders    XR lumbar spine 2-3 views                 Thank you for coming in today, please call my office if you have any concerns or questions.     Claudio ZAPIEN, CNP

## 2025-04-16 ENCOUNTER — HOSPITAL ENCOUNTER (OUTPATIENT)
Dept: RADIOLOGY | Facility: HOSPITAL | Age: 65
Discharge: HOME | End: 2025-04-16
Payer: COMMERCIAL

## 2025-04-16 DIAGNOSIS — M54.50 ACUTE RIGHT-SIDED LOW BACK PAIN WITHOUT SCIATICA: ICD-10-CM

## 2025-04-16 PROCEDURE — 72110 X-RAY EXAM L-2 SPINE 4/>VWS: CPT

## 2025-04-16 PROCEDURE — 72110 X-RAY EXAM L-2 SPINE 4/>VWS: CPT | Performed by: RADIOLOGY

## 2025-04-17 LAB
25(OH)D3+25(OH)D2 SERPL-MCNC: 55 NG/ML (ref 30–100)
ALBUMIN SERPL-MCNC: 3.7 G/DL (ref 3.6–5.1)
ALP SERPL-CCNC: 46 U/L (ref 35–144)
ALT SERPL-CCNC: 23 U/L (ref 9–46)
ANION GAP SERPL CALCULATED.4IONS-SCNC: 9 MMOL/L (CALC) (ref 7–17)
AST SERPL-CCNC: 15 U/L (ref 10–35)
BASOPHILS # BLD AUTO: 22 CELLS/UL (ref 0–200)
BASOPHILS NFR BLD AUTO: 0.2 %
BILIRUB SERPL-MCNC: 0.4 MG/DL (ref 0.2–1.2)
BUN SERPL-MCNC: 16 MG/DL (ref 7–25)
CALCIUM SERPL-MCNC: 9.4 MG/DL (ref 8.6–10.3)
CHLORIDE SERPL-SCNC: 105 MMOL/L (ref 98–110)
CHOLEST SERPL-MCNC: 274 MG/DL
CHOLEST/HDLC SERPL: 4.2 (CALC)
CO2 SERPL-SCNC: 24 MMOL/L (ref 20–32)
CREAT SERPL-MCNC: 0.76 MG/DL (ref 0.7–1.35)
EGFRCR SERPLBLD CKD-EPI 2021: 100 ML/MIN/1.73M2
EOSINOPHIL # BLD AUTO: 11 CELLS/UL (ref 15–500)
EOSINOPHIL NFR BLD AUTO: 0.1 %
ERYTHROCYTE [DISTWIDTH] IN BLOOD BY AUTOMATED COUNT: 15.9 % (ref 11–15)
EST. AVERAGE GLUCOSE BLD GHB EST-MCNC: 126 MG/DL
EST. AVERAGE GLUCOSE BLD GHB EST-SCNC: 7 MMOL/L
GLUCOSE SERPL-MCNC: 115 MG/DL (ref 65–99)
HBA1C MFR BLD: 6 %
HCT VFR BLD AUTO: 54.4 % (ref 38.5–50)
HDLC SERPL-MCNC: 65 MG/DL
HGB BLD-MCNC: 16.9 G/DL (ref 13.2–17.1)
LDLC SERPL CALC-MCNC: 194 MG/DL (CALC)
LYMPHOCYTES # BLD AUTO: 1012 CELLS/UL (ref 850–3900)
LYMPHOCYTES NFR BLD AUTO: 9.2 %
MCH RBC QN AUTO: 24.2 PG (ref 27–33)
MCHC RBC AUTO-ENTMCNC: 31.1 G/DL (ref 32–36)
MCV RBC AUTO: 77.9 FL (ref 80–100)
MONOCYTES # BLD AUTO: 495 CELLS/UL (ref 200–950)
MONOCYTES NFR BLD AUTO: 4.5 %
NEUTROPHILS # BLD AUTO: 9460 CELLS/UL (ref 1500–7800)
NEUTROPHILS NFR BLD AUTO: 86 %
NONHDLC SERPL-MCNC: 209 MG/DL (CALC)
PLATELET # BLD AUTO: 269 THOUSAND/UL (ref 140–400)
PMV BLD REES-ECKER: 11.1 FL (ref 7.5–12.5)
POTASSIUM SERPL-SCNC: 4.6 MMOL/L (ref 3.5–5.3)
PROT SERPL-MCNC: 7.1 G/DL (ref 6.1–8.1)
PSA FREE MFR SERPL: 18 % (CALC)
PSA FREE SERPL-MCNC: 0.3 NG/ML
PSA SERPL-MCNC: 1.7 NG/ML
RBC # BLD AUTO: 6.98 MILLION/UL (ref 4.2–5.8)
SODIUM SERPL-SCNC: 138 MMOL/L (ref 135–146)
TRIGL SERPL-MCNC: 48 MG/DL
TSH SERPL-ACNC: 0.64 MIU/L (ref 0.4–4.5)
VIT B12 SERPL-MCNC: 695 PG/ML (ref 200–1100)
WBC # BLD AUTO: 11 THOUSAND/UL (ref 3.8–10.8)

## 2025-04-21 ENCOUNTER — APPOINTMENT (OUTPATIENT)
Dept: PRIMARY CARE | Facility: CLINIC | Age: 65
End: 2025-04-21
Payer: COMMERCIAL

## 2025-04-21 VITALS
TEMPERATURE: 96.3 F | HEIGHT: 75 IN | BODY MASS INDEX: 27.54 KG/M2 | WEIGHT: 221.5 LBS | DIASTOLIC BLOOD PRESSURE: 80 MMHG | OXYGEN SATURATION: 97 % | SYSTOLIC BLOOD PRESSURE: 112 MMHG | HEART RATE: 73 BPM

## 2025-04-21 DIAGNOSIS — Z12.11 SCREENING FOR COLORECTAL CANCER: ICD-10-CM

## 2025-04-21 DIAGNOSIS — Z12.12 SCREENING FOR COLORECTAL CANCER: ICD-10-CM

## 2025-04-21 DIAGNOSIS — D72.829 LEUKOCYTOSIS, UNSPECIFIED TYPE: Primary | ICD-10-CM

## 2025-04-21 PROBLEM — E29.1 HYPOGONADISM IN MALE: Status: ACTIVE | Noted: 2017-03-17

## 2025-04-21 PROBLEM — R10.9 ABDOMINAL PAIN: Status: ACTIVE | Noted: 2017-06-19

## 2025-04-21 PROBLEM — R53.81 MALAISE AND FATIGUE: Status: ACTIVE | Noted: 2017-03-17

## 2025-04-21 PROBLEM — R53.83 MALAISE AND FATIGUE: Status: ACTIVE | Noted: 2017-03-17

## 2025-04-21 PROBLEM — M54.50 ACUTE BILATERAL LOW BACK PAIN WITHOUT SCIATICA: Status: ACTIVE | Noted: 2017-06-19

## 2025-04-21 PROBLEM — R03.0 ELEVATED BP WITHOUT DIAGNOSIS OF HYPERTENSION: Status: ACTIVE | Noted: 2017-03-24

## 2025-04-21 PROBLEM — K92.1 BLOOD IN STOOL: Status: ACTIVE | Noted: 2017-06-19

## 2025-04-21 PROBLEM — K42.9 UMBILICAL HERNIA WITHOUT OBSTRUCTION AND WITHOUT GANGRENE: Status: ACTIVE | Noted: 2017-03-17

## 2025-04-21 PROBLEM — R94.5 ABNORMAL RESULTS OF LIVER FUNCTION STUDIES: Status: ACTIVE | Noted: 2017-03-24

## 2025-04-21 PROBLEM — E78.2 MIXED DYSLIPIDEMIA: Status: ACTIVE | Noted: 2017-03-17

## 2025-04-21 PROBLEM — H10.9 CONJUNCTIVITIS: Status: ACTIVE | Noted: 2025-04-21

## 2025-04-21 PROCEDURE — 3074F SYST BP LT 130 MM HG: CPT

## 2025-04-21 PROCEDURE — 1036F TOBACCO NON-USER: CPT

## 2025-04-21 PROCEDURE — 3008F BODY MASS INDEX DOCD: CPT

## 2025-04-21 PROCEDURE — 3079F DIAST BP 80-89 MM HG: CPT

## 2025-04-21 PROCEDURE — 99396 PREV VISIT EST AGE 40-64: CPT

## 2025-04-21 ASSESSMENT — ENCOUNTER SYMPTOMS
ALLERGIC/IMMUNOLOGIC NEGATIVE: 1
ACTIVITY CHANGE: 0
UNEXPECTED WEIGHT CHANGE: 0
FEVER: 0
ENDOCRINE NEGATIVE: 1
PSYCHIATRIC NEGATIVE: 1
SHORTNESS OF BREATH: 0
ABDOMINAL PAIN: 0
BACK PAIN: 1
RESPIRATORY NEGATIVE: 1
HEADACHES: 0
WEAKNESS: 0
GASTROINTESTINAL NEGATIVE: 1
FATIGUE: 0
DIZZINESS: 0
CARDIOVASCULAR NEGATIVE: 1

## 2025-04-21 NOTE — PATIENT INSTRUCTIONS
Return if no better with the back  Colonoscopy    Recheck cbc in 1 month.    Thank you for coming in today, if any questions or concerns arise, please call my office.   CURRY Juarez-CNP

## 2025-04-30 ENCOUNTER — TELEPHONE (OUTPATIENT)
Dept: PRIMARY CARE | Facility: CLINIC | Age: 65
End: 2025-04-30
Payer: COMMERCIAL

## 2025-04-30 DIAGNOSIS — M54.50 ACUTE BILATERAL LOW BACK PAIN WITHOUT SCIATICA: Primary | ICD-10-CM

## 2025-04-30 RX ORDER — METHYLPREDNISOLONE 4 MG/1
TABLET ORAL
Qty: 21 TABLET | Refills: 0 | Status: SHIPPED | OUTPATIENT
Start: 2025-04-30 | End: 2025-05-06

## 2025-04-30 NOTE — TELEPHONE ENCOUNTER
Charlie called requesting another steroid prescription for his back pain. He states this helped but his back pain has returned.

## 2025-04-30 NOTE — TELEPHONE ENCOUNTER
The muscle relaxer is giving him a HA.  Is there something else that he is able to take .  The wife is aware of the Rx sent to [facility]

## 2025-05-14 ENCOUNTER — TELEPHONE (OUTPATIENT)
Dept: PRIMARY CARE | Facility: CLINIC | Age: 65
End: 2025-05-14
Payer: COMMERCIAL

## 2025-05-14 DIAGNOSIS — I10 PRIMARY HYPERTENSION: ICD-10-CM

## 2025-05-14 DIAGNOSIS — M54.50 ACUTE BILATERAL LOW BACK PAIN WITHOUT SCIATICA: Primary | ICD-10-CM

## 2025-05-14 RX ORDER — CARVEDILOL 12.5 MG/1
6.25 TABLET ORAL 2 TIMES DAILY
Status: SHIPPED
Start: 2025-05-14 | End: 2025-08-12

## 2025-05-14 RX ORDER — LISINOPRIL 10 MG/1
5 TABLET ORAL DAILY
Status: SHIPPED
Start: 2025-05-14 | End: 2025-08-12

## 2025-05-14 RX ORDER — KETOROLAC TROMETHAMINE 10 MG/1
10 TABLET, FILM COATED ORAL 4 TIMES DAILY PRN
Qty: 20 TABLET | Refills: 0 | Status: SHIPPED | OUTPATIENT
Start: 2025-05-14 | End: 2025-05-19

## 2025-05-14 RX ORDER — METHYLPREDNISOLONE 4 MG/1
TABLET ORAL
Qty: 21 TABLET | Refills: 0 | Status: SHIPPED | OUTPATIENT
Start: 2025-05-14 | End: 2025-05-20

## 2025-05-14 NOTE — PROGRESS NOTES
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

## 2025-05-21 DIAGNOSIS — D72.829 LEUKOCYTOSIS, UNSPECIFIED TYPE: ICD-10-CM

## 2025-05-28 ENCOUNTER — TELEPHONE (OUTPATIENT)
Dept: PRIMARY CARE | Facility: CLINIC | Age: 65
End: 2025-05-28
Payer: COMMERCIAL

## 2025-05-28 NOTE — TELEPHONE ENCOUNTER
Charlie called asking how soon he should have repeat lab work done. He finished the steroid a few days ago.

## 2025-06-16 ENCOUNTER — TELEPHONE (OUTPATIENT)
Dept: PRIMARY CARE | Facility: CLINIC | Age: 65
End: 2025-06-16
Payer: COMMERCIAL

## 2025-06-17 DIAGNOSIS — M54.50 ACUTE RIGHT-SIDED LOW BACK PAIN WITHOUT SCIATICA: Primary | ICD-10-CM

## 2025-06-17 RX ORDER — TRAMADOL HYDROCHLORIDE 50 MG/1
50 TABLET, FILM COATED ORAL EVERY 6 HOURS PRN
Qty: 56 TABLET | Refills: 0 | Status: SHIPPED | OUTPATIENT
Start: 2025-06-17 | End: 2025-07-01

## 2025-06-18 NOTE — TELEPHONE ENCOUNTER
Charlie was in this afternoon.   He stated that he was not wanting the medication.   He did pick it up,  He would like to talk to you.   I did schedule him an appointment for next week.   .

## 2025-06-19 ENCOUNTER — OFFICE VISIT (OUTPATIENT)
Dept: PRIMARY CARE | Facility: CLINIC | Age: 65
End: 2025-06-19
Payer: COMMERCIAL

## 2025-06-19 VITALS
DIASTOLIC BLOOD PRESSURE: 84 MMHG | WEIGHT: 217.7 LBS | BODY MASS INDEX: 27.58 KG/M2 | SYSTOLIC BLOOD PRESSURE: 138 MMHG | TEMPERATURE: 96.5 F | HEART RATE: 71 BPM | OXYGEN SATURATION: 97 %

## 2025-06-19 DIAGNOSIS — M54.50 ACUTE RIGHT-SIDED LOW BACK PAIN WITHOUT SCIATICA: Primary | ICD-10-CM

## 2025-06-19 PROCEDURE — 3079F DIAST BP 80-89 MM HG: CPT

## 2025-06-19 PROCEDURE — 99213 OFFICE O/P EST LOW 20 MIN: CPT

## 2025-06-19 PROCEDURE — 3075F SYST BP GE 130 - 139MM HG: CPT

## 2025-06-19 PROCEDURE — 1036F TOBACCO NON-USER: CPT

## 2025-06-19 NOTE — PROGRESS NOTES
Subjective   Patient ID: Charlie Fisher is a 64 y.o. male who presents for Back Pain (Been having back pain since February. Pain rate 7 out 10).    Back Pain  Patient presents for evaluation of low back problems. Symptoms have been present for a few months and include numbness, paresthesias, and stiffness. Initial inciting event: working outdoors. Symptoms are worse in the middle of the day. aggravating factors identifiable by the patient are increased intrathoracic pressure (cough, sneeze, etc.) and sitting. Aggravating factors identifiable by the patient are walking uphill. Treatments initiated by the patient: multiple rounds of medrol dose pack, flexeril, tramadol. Previous lower back problems: long standing problem, in exacerbation. Previous work up: XR DDD. Previous treatments: see above.    Recommend pain med referral and MRI lumbar spine.         Vitals:    06/19/25 1526   BP: 138/84   Pulse: 71   Temp: 35.8 °C (96.5 °F)   SpO2: 97%       Review of Systems    Objective   Physical Exam    Assessment/Plan   Problem List Items Addressed This Visit    None  Visit Diagnoses         Acute right-sided low back pain without sciatica    -  Primary    Relevant Orders    Referral to Pain Medicine    MR lumbar spine w and wo IV contrast                 Thank you for coming in today, please call my office if you have any concerns or questions.     Claudio ZAPIEN, CNP

## 2025-06-19 NOTE — PATIENT INSTRUCTIONS
Pain med referral Dr. An  MRI lumbar spine ordered.    Thank you for coming in today, if any questions or concerns arise, please call my office.   CURRY Juarez-CNP

## 2025-06-24 ENCOUNTER — APPOINTMENT (OUTPATIENT)
Dept: PRIMARY CARE | Facility: CLINIC | Age: 65
End: 2025-06-24
Payer: COMMERCIAL

## 2025-07-08 ENCOUNTER — HOSPITAL ENCOUNTER (OUTPATIENT)
Dept: RADIOLOGY | Facility: CLINIC | Age: 65
Discharge: HOME | End: 2025-07-08
Payer: COMMERCIAL

## 2025-07-08 DIAGNOSIS — M54.50 ACUTE RIGHT-SIDED LOW BACK PAIN WITHOUT SCIATICA: ICD-10-CM

## 2025-07-08 PROCEDURE — 72148 MRI LUMBAR SPINE W/O DYE: CPT

## 2025-07-08 PROCEDURE — 72148 MRI LUMBAR SPINE W/O DYE: CPT | Performed by: RADIOLOGY

## 2025-07-09 NOTE — RESULT ENCOUNTER NOTE
Results were abnormal... moderate to severe neural foraminal stenosis which puts pressure on the nerves at the low end of the lumbar spine. Recommend he see pain med next week for follow up. Thanks.

## 2025-07-16 ENCOUNTER — APPOINTMENT (OUTPATIENT)
Dept: PAIN MEDICINE | Facility: HOSPITAL | Age: 65
End: 2025-07-16
Payer: COMMERCIAL

## 2025-07-24 ENCOUNTER — APPOINTMENT (OUTPATIENT)
Facility: CLINIC | Age: 65
End: 2025-07-24
Payer: COMMERCIAL

## 2025-07-24 VITALS — HEIGHT: 75 IN | WEIGHT: 218 LBS | BODY MASS INDEX: 27.1 KG/M2

## 2025-07-24 DIAGNOSIS — M54.51 VERTEBROGENIC LOW BACK PAIN: ICD-10-CM

## 2025-07-24 DIAGNOSIS — M43.06 LUMBAR SPONDYLOLYSIS: Primary | ICD-10-CM

## 2025-07-24 PROCEDURE — 3008F BODY MASS INDEX DOCD: CPT | Performed by: STUDENT IN AN ORGANIZED HEALTH CARE EDUCATION/TRAINING PROGRAM

## 2025-07-24 PROCEDURE — 99204 OFFICE O/P NEW MOD 45 MIN: CPT | Performed by: STUDENT IN AN ORGANIZED HEALTH CARE EDUCATION/TRAINING PROGRAM

## 2025-07-24 RX ORDER — DIAZEPAM 5 MG/1
5 TABLET ORAL ONCE AS NEEDED
OUTPATIENT
Start: 2025-07-24

## 2025-07-24 RX ORDER — KETOROLAC TROMETHAMINE 10 MG/1
10 TABLET, FILM COATED ORAL EVERY 6 HOURS PRN
Qty: 20 TABLET | Refills: 0 | Status: SHIPPED | OUTPATIENT
Start: 2025-07-24 | End: 2025-07-29

## 2025-07-24 ASSESSMENT — PATIENT HEALTH QUESTIONNAIRE - PHQ9
1. LITTLE INTEREST OR PLEASURE IN DOING THINGS: NOT AT ALL
2. FEELING DOWN, DEPRESSED OR HOPELESS: NOT AT ALL
SUM OF ALL RESPONSES TO PHQ9 QUESTIONS 1 AND 2: 0

## 2025-07-24 ASSESSMENT — PAIN SCALES - GENERAL: PAINLEVEL_OUTOF10: 4

## 2025-07-24 ASSESSMENT — ENCOUNTER SYMPTOMS
OCCASIONAL FEELINGS OF UNSTEADINESS: 0
LOSS OF SENSATION IN FEET: 0

## 2025-07-24 NOTE — PROGRESS NOTES
Subjective   Patient ID: Charlie Fisher is a 64 y.o. male who presents for New Patient Visit (Patient complains of low back pain. He states he recently had an MRI. His pain is worse in the morning, but gets a bit better after moving around. ).  HPI    Mr Charlie Fisher is a 64-year-old male new patient visit who presents with complaints of lower back pain as well as lower extremity pain and perceived weakness.    The patient presents with back pain radiating to the BILATERAL lower extremity, described as dull and aching in nature. Pain is associated with numbness, tingling, and/or weakness in the the lower extremities. Symptoms have been present for about 2 years and are worsened by movements, particularly walking or standing.  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      Additionally the patient states that he has pain in his lower back in a bandlike distribution when he bends forward or stands up.  It is worsened sometimes with twisting motions of his back such as when he is golfing.  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    The patient reports relief with rest and over-the-counter medications, though symptoms have persisted. No recent trauma reported. Denies bowel or bladder incontinence, gait instability, or systemic symptoms such as fever or weight loss.  has previously attempted conservative measures for at least 6 weeks, including over-the-counter medications such as NSAIDs, Tylenol, gabapentin, physical therapy or chiropractic care. He has attempted steroid dose packs in the past with out significant relief.        Review of Systems     Current Outpatient Medications   Medication Instructions    albuterol (Ventolin HFA) 90 mcg/actuation inhaler 2 puffs, inhalation, Every 4 hours PRN    APPLE CIDER VINEGAR ORAL 188  mg    calcium carbonate-vitamin D3 600 mg-10 mcg (400 unit) capsule Daily    carvedilol (COREG) 6.25 mg, oral, 2 times daily    cyclobenzaprine (FLEXERIL) 5 mg, oral, 3 times daily PRN    fish oil/borage/flax/om3,6,9 1 (OMEGA 3-6-9 COMPLEX ORAL) Take by mouth.    fluticasone (Flonase) 50 mcg/actuation nasal spray 1 spray, Each Nostril, Daily, Shake gently. Before first use, prime pump. After use, clean tip and replace cap.    lisinopril 5 mg, oral, Daily    methocarbamol (ROBAXIN) 500 mg, oral, 4 times daily PRN    sildenafil (VIAGRA) 25-50 mg, oral, As needed, Take 1-2 tablets on empty stomach one hour before planned sexual activity.    TURMERIC ORAL 500 mg    zinc gluconate 50 mg tablet 50 mg of elemental zinc, Daily        Medical History[1]     Surgical History[2]     Family History[3]     RX Allergies[4]     MR lumbar spine wo IV contrast 07/08/2025    Narrative  Interpreted By:  Addison Lu,  STUDY:  MRI of the lumbar spine without IV contrast;  7/8/2025 2:37 pm    INDICATION:  Signs/Symptoms:chronic low back pain.    ,M54.50 Low back pain, unspecified    COMPARISON:  None.    ACCESSION NUMBER(S):  [id number]    ORDERING CLINICIAN:  TAMIKO ENGLISH    TECHNIQUE:  Multiplanar, multisequence imaging obtained through the lumbar spine  without contrast    FINDINGS:  The L5-S1 level be defined as axial T2 series 12 image 16.    Alignment: There is no significant subluxation.    Vertebrae:   Degenerative endplate changes are present. Prominent  Modic type 2 changes are present at L5 and S1 and to a lesser degree  at L3 and L4. A Schmorl's node is present within the superior  endplate of L5. Two hemangiomas are present within T12, and this is a  benign finding.    Discs: There are varying degrees of loss of disc height and disc  desiccation.    Spinal cord: Signal within the spinal cord is normal. The conus  medullaris terminates at L1-L2.    Spinal canal: There is no significant spinal canal narrowing.    L1-L2:  There is no significant lateral recess, neural foraminal  narrowing or spinal canal narrowing.    L2-L3: There is a mild circumferential disc bulge. There is mild  bilateral neural foraminal narrowing.    L3-L4: There is a mild circumferential disc bulge. There is  ligamentum flavum buckling. There are degenerative changes of the  facet joints of the right side more involved than the left. There is  mild-to-moderate bilateral neural foraminal narrowing.    L4-L5: There is a circumferential disc bulge, ligamentum flavum  buckling and degenerative changes of the facet joints. There is  moderate-to-severe bilateral neural foraminal narrowing.    L5-S1: There is a circumferential disc bulge. There are degenerative  changes of the facet joints. There is moderate-to-severe bilateral  neural foraminal narrowing.    Soft tissues: The paraspinal soft tissues are normal. A right renal  cyst is present.    Impression  1. There are diffuse degenerative changes of the lumbar spine. These  changes include moderate-to-severe bilateral neural foraminal  narrowing at L4-L5 and L5-S1. Radiographically, the L5-S1 level is  the most involved.    MACRO:  None    Signed by: Addison Lu 7/8/2025 6:04 PM  Dictation workstation:   [id number]      Objective     There were no vitals filed for this visit.   Pain Score:   4        Physical Exam    GENERAL EXAM  Vital Signs: Vital signs to include heart rate, respiration rate, blood pressure, and temperature were reviewed.  General Appearance:  Awake, alert, healthy appearing, well developed, No acute distress.  Head: Normocephalic without evidence of head injury.  Neck: The appearance of the neck was normal without swelling with a midline trachea.  Eyes: The eyelids and eyebrows exhibited no abnormalities.  Pupils were not pin-point.  Sclera was without icterus.  Lungs: Respiration rhythm and depth was normal.  Respiratory movements were normal without labored breathing.  Cardiovascular: No  peripheral edema was present.    Neurological: Patient was oriented to time, place, and person.  Speech was normal.  Balance, gait, and stance were unremarkable.    Psychiatric: Appearance was normal with appropriate dress.  Mood was euthymic and affect was normal.  Skin: Affected regions were without ecchymosis or skin lesions.    Physical exam as above except:            Assessment/Plan   Problem List Items Addressed This Visit           ICD-10-CM    Vertebrogenic low back pain M54.51     Other Visit Diagnoses         Codes      Lumbar spondylolysis    -  Primary M43.06    Relevant Medications    ketorolac (Toradol) 10 mg tablet    Other Relevant Orders    [place] pain management    Epidural Steroid Injection        ASSESSMENT  Mr Charlie Fisher is a 64-year-old male new patient visit who presents with complaints of lower back pain as well as lower extremity pain and perceived weakness.    The patient presents with back pain radiating to the BILATERAL lower extremity, described as dull and aching in nature. Pain is associated with numbness, tingling, and/or weakness in the the lower extremities. Symptoms have been present for about 2 years and are worsened by movements, particularly walking or standing.  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      Additionally the patient states that he has pain in his lower back in a bandlike distribution when he bends forward or stands up.  It is worsened sometimes with twisting motions of his back such as when he is golfing.  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    The patient reports relief with rest and over-the-counter medications, though symptoms have persisted. No recent trauma reported. Denies bowel or bladder incontinence, gait instability, or systemic symptoms such as fever or  weight loss.  has previously attempted conservative measures for at least 6 weeks, including over-the-counter medications such as NSAIDs, Tylenol, gabapentin, physical therapy or chiropractic care. He has attempted steroid dose packs in the past with out significant relief.    Imagin2025- MRI L SPINE  L4-L5: There is a circumferential disc bulge, ligamentum flavum buckling and degenerative changes of the facet joints. There is moderate-to-severe bilateral neural foraminal narrowing.      L5-S1: There is a circumferential disc bulge. There are degenerative changes of the facet joints. There is moderate-to-severe bilateral neural foraminal narrowing.      Modic changes noted in the lower lumbar levels including sacrum  Facet joint degenerative changes notable    Physical Exam:  Reflexes 2+ patellar  Muscle strength 5/5  Pain to palpation of paraspinal musculature  Pinprick sensation decreased in right L4 and L5 dermatomes at level of feet, greater than left    Previous Interventions, date, and resultant relief:  None    Diagnosis  Vertebrogenic pain  Facet joint arthropathy of the lumbar spine  Neuroforaminal stenosis of the lumbar spine    PLAN  -The patient may benefit from a lumbar epidural steroid injection at L4-L5.  He is not currently on blood thinning medication.  The risk benefits and alternatives were discussed with the patient agreed to proceed  -The patient may benefit from a short course of ketorolac.  He was advised not to take any other NSAIDs along with this.          This note was generated with the aid of dictation software, there may be typos despite my attempts at proofreading.          [1]   Past Medical History:  Diagnosis Date    Hypertension     Low back pain 2025    Varicella    [2]   Past Surgical History:  Procedure Laterality Date    CARDIAC CATHETERIZATION      OTHER SURGICAL HISTORY  12/15/2021    Cardiac catheterization   [3]   Family History  Problem Relation Name Age of  Onset    Other (malignant neoplasm of breast) Mother      Other (diabetes mellitus) Other grandmother     Heart attack Other paternal relatives    [4] No Known Allergies

## 2025-08-15 ENCOUNTER — HOSPITAL ENCOUNTER (OUTPATIENT)
Dept: PAIN MEDICINE | Facility: HOSPITAL | Age: 65
Discharge: HOME | End: 2025-08-15
Payer: COMMERCIAL

## 2025-08-15 VITALS
HEART RATE: 76 BPM | BODY MASS INDEX: 26.73 KG/M2 | SYSTOLIC BLOOD PRESSURE: 146 MMHG | HEIGHT: 75 IN | OXYGEN SATURATION: 98 % | DIASTOLIC BLOOD PRESSURE: 90 MMHG | WEIGHT: 215 LBS | TEMPERATURE: 96.8 F | RESPIRATION RATE: 16 BRPM

## 2025-08-15 DIAGNOSIS — M43.06 LUMBAR SPONDYLOLYSIS: ICD-10-CM

## 2025-08-15 PROCEDURE — 99152 MOD SED SAME PHYS/QHP 5/>YRS: CPT | Performed by: STUDENT IN AN ORGANIZED HEALTH CARE EDUCATION/TRAINING PROGRAM

## 2025-08-15 PROCEDURE — 7100000009 HC PHASE TWO TIME - INITIAL BASE CHARGE

## 2025-08-15 PROCEDURE — 62323 NJX INTERLAMINAR LMBR/SAC: CPT | Performed by: STUDENT IN AN ORGANIZED HEALTH CARE EDUCATION/TRAINING PROGRAM

## 2025-08-15 PROCEDURE — 2500000004 HC RX 250 GENERAL PHARMACY W/ HCPCS (ALT 636 FOR OP/ED): Mod: JW | Performed by: STUDENT IN AN ORGANIZED HEALTH CARE EDUCATION/TRAINING PROGRAM

## 2025-08-15 PROCEDURE — 2550000001 HC RX 255 CONTRASTS: Performed by: STUDENT IN AN ORGANIZED HEALTH CARE EDUCATION/TRAINING PROGRAM

## 2025-08-15 PROCEDURE — 7100000010 HC PHASE TWO TIME - EACH INCREMENTAL 1 MINUTE

## 2025-08-15 RX ORDER — METHYLPREDNISOLONE ACETATE 40 MG/ML
INJECTION, SUSPENSION INTRA-ARTICULAR; INTRALESIONAL; INTRAMUSCULAR; SOFT TISSUE AS NEEDED
Status: COMPLETED | OUTPATIENT
Start: 2025-08-15 | End: 2025-08-15

## 2025-08-15 RX ORDER — KETOROLAC TROMETHAMINE 10 MG/1
10 TABLET, FILM COATED ORAL EVERY 6 HOURS PRN
COMMUNITY

## 2025-08-15 RX ORDER — LIDOCAINE HYDROCHLORIDE 5 MG/ML
INJECTION, SOLUTION INFILTRATION; INTRAVENOUS AS NEEDED
Status: COMPLETED | OUTPATIENT
Start: 2025-08-15 | End: 2025-08-15

## 2025-08-15 RX ORDER — MIDAZOLAM HYDROCHLORIDE 1 MG/ML
INJECTION, SOLUTION INTRAMUSCULAR; INTRAVENOUS AS NEEDED
Status: COMPLETED | OUTPATIENT
Start: 2025-08-15 | End: 2025-08-15

## 2025-08-15 RX ORDER — LIDOCAINE HYDROCHLORIDE 10 MG/ML
INJECTION, SOLUTION EPIDURAL; INFILTRATION; INTRACAUDAL; PERINEURAL AS NEEDED
Status: COMPLETED | OUTPATIENT
Start: 2025-08-15 | End: 2025-08-15

## 2025-08-15 RX ORDER — FENTANYL CITRATE 50 UG/ML
INJECTION, SOLUTION INTRAMUSCULAR; INTRAVENOUS AS NEEDED
Status: COMPLETED | OUTPATIENT
Start: 2025-08-15 | End: 2025-08-15

## 2025-08-15 RX ORDER — DIAZEPAM 5 MG/1
5 TABLET ORAL ONCE AS NEEDED
Status: DISCONTINUED | OUTPATIENT
Start: 2025-08-15 | End: 2025-08-16 | Stop reason: HOSPADM

## 2025-08-15 RX ADMIN — LIDOCAINE HYDROCHLORIDE 10 ML: 5 INJECTION, SOLUTION INFILTRATION at 11:52

## 2025-08-15 RX ADMIN — IOHEXOL 3 ML: 240 INJECTION, SOLUTION INTRATHECAL; INTRAVASCULAR; INTRAVENOUS; ORAL at 11:53

## 2025-08-15 RX ADMIN — MIDAZOLAM 2 MG: 1 INJECTION INTRAMUSCULAR; INTRAVENOUS at 11:51

## 2025-08-15 RX ADMIN — LIDOCAINE HYDROCHLORIDE 5 ML: 10 INJECTION, SOLUTION EPIDURAL; INFILTRATION; INTRACAUDAL; PERINEURAL at 11:52

## 2025-08-15 RX ADMIN — FENTANYL CITRATE 100 MCG: 50 INJECTION, SOLUTION INTRAMUSCULAR; INTRAVENOUS at 11:51

## 2025-08-15 RX ADMIN — METHYLPREDNISOLONE ACETATE 40 MG: 40 INJECTION, SUSPENSION INTRA-ARTICULAR; INTRALESIONAL; INTRAMUSCULAR; SOFT TISSUE at 11:53

## 2025-08-15 ASSESSMENT — PAIN - FUNCTIONAL ASSESSMENT
PAIN_FUNCTIONAL_ASSESSMENT: 0-10

## 2025-08-15 ASSESSMENT — PAIN SCALES - GENERAL
PAINLEVEL_OUTOF10: 0 - NO PAIN
PAINLEVEL_OUTOF10: 0 - NO PAIN
PAINLEVEL_OUTOF10: 2
PAINLEVEL_OUTOF10: 0 - NO PAIN

## 2025-08-18 ASSESSMENT — PAIN SCALES - GENERAL: PAINLEVEL_OUTOF10: 2

## 2025-09-04 ENCOUNTER — APPOINTMENT (OUTPATIENT)
Facility: CLINIC | Age: 65
End: 2025-09-04
Payer: COMMERCIAL

## 2025-09-04 PROBLEM — F41.9 ANXIETY: Status: RESOLVED | Noted: 2023-01-20 | Resolved: 2025-09-04

## 2025-09-04 PROBLEM — M54.50 ACUTE BILATERAL LOW BACK PAIN WITHOUT SCIATICA: Status: RESOLVED | Noted: 2017-06-19 | Resolved: 2025-09-04

## 2025-09-04 PROBLEM — R94.5 ABNORMAL RESULTS OF LIVER FUNCTION STUDIES: Status: RESOLVED | Noted: 2017-03-24 | Resolved: 2025-09-04

## 2025-09-04 PROBLEM — L57.0 ACTINIC KERATOSIS: Status: RESOLVED | Noted: 2023-01-20 | Resolved: 2025-09-04

## 2025-09-04 PROBLEM — M43.06 LUMBAR SPONDYLOLYSIS: Status: ACTIVE | Noted: 2025-09-04

## 2025-09-04 ASSESSMENT — COLUMBIA-SUICIDE SEVERITY RATING SCALE - C-SSRS
2. HAVE YOU ACTUALLY HAD ANY THOUGHTS OF KILLING YOURSELF?: NO
6. HAVE YOU EVER DONE ANYTHING, STARTED TO DO ANYTHING, OR PREPARED TO DO ANYTHING TO END YOUR LIFE?: NO
1. IN THE PAST MONTH, HAVE YOU WISHED YOU WERE DEAD OR WISHED YOU COULD GO TO SLEEP AND NOT WAKE UP?: NO

## 2025-09-04 ASSESSMENT — PATIENT HEALTH QUESTIONNAIRE - PHQ9
SUM OF ALL RESPONSES TO PHQ9 QUESTIONS 1 AND 2: 0
1. LITTLE INTEREST OR PLEASURE IN DOING THINGS: NOT AT ALL
2. FEELING DOWN, DEPRESSED OR HOPELESS: NOT AT ALL

## 2025-09-04 ASSESSMENT — PAIN SCALES - GENERAL: PAINLEVEL_OUTOF10: 2
